# Patient Record
Sex: FEMALE | Race: WHITE | Employment: PART TIME | URBAN - METROPOLITAN AREA
[De-identification: names, ages, dates, MRNs, and addresses within clinical notes are randomized per-mention and may not be internally consistent; named-entity substitution may affect disease eponyms.]

---

## 2021-03-18 ENCOUNTER — VIRTUAL VISIT (OUTPATIENT)
Dept: INTERNAL MEDICINE CLINIC | Age: 21
End: 2021-03-18
Payer: COMMERCIAL

## 2021-03-18 DIAGNOSIS — R21 RASH: Primary | ICD-10-CM

## 2021-03-18 DIAGNOSIS — J35.1 TONSILLAR HYPERTROPHY, UNILATERAL: ICD-10-CM

## 2021-03-18 PROCEDURE — 99203 OFFICE O/P NEW LOW 30 MIN: CPT | Performed by: FAMILY MEDICINE

## 2021-03-18 RX ORDER — NORETHINDRONE ACETATE AND ETHINYL ESTRADIOL AND FERROUS FUMARATE 1MG-20(21)
1 KIT ORAL DAILY
COMMUNITY
Start: 2021-03-05

## 2021-03-18 RX ORDER — METHYLPREDNISOLONE 4 MG/1
TABLET ORAL
Qty: 1 DOSE PACK | Refills: 0 | Status: SHIPPED | OUTPATIENT
Start: 2021-03-18

## 2021-03-18 NOTE — PROGRESS NOTES
Chief Complaint   Patient presents with    New Patient     rash          1. Have you been to the ER, urgent care clinic since your last visit? Hospitalized since your last visit? No    2. Have you seen or consulted any other health care providers outside of the [address] [place] [address] since your last visit? Include any pap smears or colon screening.  No

## 2021-03-22 NOTE — PROGRESS NOTES
Sherri Trujillo (: 2000) is a 21 y.o. female, new patient, here for evaluation of the following chief complaint(s):   New Patient (rash )       ASSESSMENT/PLAN:  1. Rash  -     methylPREDNISolone (MEDROL DOSEPACK) 4 mg tablet; Take as directed for rash, Normal, Disp-1 Dose Pack, R-0  2. Tonsillar hypertrophy, unilateral    Pt recommended to get a rapid strep test, gargle, use pain relievers as needed, follow up if not improved    I have discussed the diagnosis with the patient and the intended plan as seen in the  orders above. The patient understands and agrees with the plan. All questions the patient posed were answered. Patient labs and/or xrays were reviewed  Past records were reviewed. Advised patient to call back or return to office if symptoms worsen/change/persist.  Discussed expected course/resolution/complications of diagnosis in detail with patient. Medication risks/benefits/costs/interactions/alternatives discussed with patient      Return if symptoms worsen or fail to improve. SUBJECTIVE/OBJECTIVE:  HPI  New patient for rash evaluation. Rash present on back of thigh x 1.5wk. Patient used a friend's ointment who also had a similar rash. Pt has 2 annular eruptions. The one on her buttock improved and left a dark katlyn. The one on her thigh appeared later and persists. Each itched some at onset. Pt has 1 enlarged tonsil with white exudate x Friday. Denies fever, joint aches, other uri sx. LMP 3-14-21. meds include ocp and topical acne product. PMH and allergic history are  Unremarkable. Review of Systems   Constitutional: Negative for chills and fever. HENT: Negative for congestion, ear pain, sinus pain and sneezing. Unilateral tonsillar enlargement with exudate   Respiratory: Negative for cough. Skin: Positive for rash.         Patient-Reported Vitals 3/18/2021   Patient-Reported Weight 101   Patient-Reported Height 5'6\"   Patient-Reported Temperature 97.7   Patient-Reported [facility] 03/18/2021       Physical Exam    [INSTRUCTIONS:  \"[x]\" Indicates a positive item  \"[]\" Indicates a negative item  -- DELETE ALL ITEMS NOT EXAMINED]    Constitutional: [x] Appears well-developed and well-nourished [x] No apparent distress      [] Abnormal -     Mental status: [x] Alert and awake  [x] Oriented to person/place/time [x] Able to follow commands    [] Abnormal -     Eyes:   EOM    [x]  Normal    [] Abnormal -   Sclera  [x]  Normal    [] Abnormal -          Discharge [x]  None visible   [] Abnormal -     HENT: [x] Normocephalic, atraumatic  [] Abnormal -   [x] Mouth/Throat: Mucous membranes are moist    External Ears [x] Normal  [] Abnormal -    Neck: [x] No visualized mass [] Abnormal -     Pulmonary/Chest: [x] Respiratory effort normal   [x] No visualized signs of difficulty breathing or respiratory distress        [] Abnormal -      Musculoskeletal:   [x] Normal gait with no signs of ataxia         [x] Normal range of motion of neck        [] Abnormal -     Neurological:        [x] No Facial Asymmetry (Cranial nerve 7 motor function) (limited exam due to video visit)          [x] No gaze palsy        [] Abnormal -          Skin:        [x] No significant exanthematous lesions or discoloration noted on facial skin         [] Abnormal -            Psychiatric:       [x] Normal Affect [] Abnormal -        [x] No Hallucinations    Other pertinent observable physical exam findings:-            Tae Herman, was evaluated through a synchronous (real-time) audio-video encounter. The patient (or guardian if applicable) is aware that this is a billable service. Verbal consent to proceed has been obtained within the past 12 months. The visit was conducted pursuant to the emergency declaration under the [facility], [address] [place] [address] authority and the [employer] and [employer] Act.   Patient identification was verified, and a caregiver was present when appropriate. The patient was located in a state where the provider was credentialed to provide care. An electronic signature was used to authenticate this note.   -- Eduin Castillo MD

## 2024-12-11 ENCOUNTER — RX ONLY (RX ONLY)
Age: 24
End: 2024-12-11

## 2024-12-11 RX ORDER — DOXYCYCLINE 100 MG/1
TABLET, FILM COATED ORAL
Qty: 28 | Refills: 0 | Status: ERX | COMMUNITY
Start: 2024-12-11

## 2024-12-11 RX ORDER — TOBRAMYCIN / DEXAMETHASONE 3; .5 MG/ML; MG/ML
SUSPENSION/ DROPS OPHTHALMIC
Qty: 5 | Refills: 1 | Status: ERX | COMMUNITY
Start: 2024-12-11

## 2024-12-12 ENCOUNTER — RX ONLY (RX ONLY)
Age: 24
End: 2024-12-12

## 2024-12-12 RX ORDER — TOBRAMYCIN / DEXAMETHASONE 3; .5 MG/ML; MG/ML
SUSPENSION/ DROPS OPHTHALMIC
Qty: 5 | Refills: 1 | Status: ERX

## 2024-12-13 ENCOUNTER — APPOINTMENT (OUTPATIENT)
Dept: URBAN - METROPOLITAN AREA CLINIC 309 | Age: 24
Setting detail: DERMATOLOGY
End: 2024-12-13

## 2024-12-13 DIAGNOSIS — L90.5 SCAR CONDITIONS AND FIBROSIS OF SKIN: ICD-10-CM

## 2024-12-13 PROCEDURE — OTHER ADDITIONAL NOTES: OTHER

## 2024-12-13 PROCEDURE — OTHER COSMETIC CONSULTATION: CUTERA SECRET PRO: OTHER

## 2024-12-13 PROCEDURE — OTHER COUNSELING: OTHER

## 2024-12-13 PROCEDURE — OTHER COSMETIC CONSULTATION: MICRONEEDLING: OTHER

## 2024-12-13 PROCEDURE — OTHER TREATMENT REGIMEN: OTHER

## 2024-12-13 ASSESSMENT — LOCATION SIMPLE DESCRIPTION DERM
LOCATION SIMPLE: INFERIOR FOREHEAD
LOCATION SIMPLE: LEFT CHEEK
LOCATION SIMPLE: RIGHT CHEEK

## 2024-12-13 ASSESSMENT — LOCATION ZONE DERM: LOCATION ZONE: FACE

## 2024-12-13 ASSESSMENT — LOCATION DETAILED DESCRIPTION DERM
LOCATION DETAILED: LEFT INFERIOR CENTRAL MALAR CHEEK
LOCATION DETAILED: INFERIOR MID FOREHEAD
LOCATION DETAILED: RIGHT INFERIOR CENTRAL MALAR CHEEK

## 2024-12-13 NOTE — HPI: PIMPLES (ACNE)
Is This A New Presentation, Or A Follow-Up?: Acne
Additional Comments (Use Complete Sentences): Patient presents today for cosmetic consultation for acne scarring. Has not tried any prior treatment options. Currently on spironolactone 100 mg PO QD and BPO in the evenings.

## 2024-12-13 NOTE — PROCEDURE: ADDITIONAL NOTES
Additional Notes: We reccomended patient start Accutane to help get rid of acne for good.\\n\\nWe reccomended RFMN to improve skin texture. Quoted pt $480 per session of RMFN ($800 - 40% employee discount). Informed pt she would need 4-5 sessions to see a maximum of 70% improvement in skin quality and texture. Also recommended Skin[employer]uticals Post-Ablative Kit ($280 -40%).\\n\\nInformed patient she may schedule RFMN at her own discretion.
Detail Level: Simple
Render Risk Assessment In Note?: no

## 2025-02-13 ENCOUNTER — RX ONLY (RX ONLY)
Age: 25
End: 2025-02-13

## 2025-02-13 RX ORDER — TAZAROTENE 0.45 MG/G
LOTION TOPICAL
Qty: 45 | Refills: 3 | Status: ERX | COMMUNITY
Start: 2025-02-13

## 2025-02-19 ENCOUNTER — RX ONLY (RX ONLY)
Age: 25
End: 2025-02-19

## 2025-02-19 RX ORDER — TAZAROTENE 1 MG/G
CREAM TOPICAL
Qty: 60 | Refills: 8 | Status: ERX | COMMUNITY
Start: 2025-02-19

## 2025-02-21 ENCOUNTER — RX ONLY (RX ONLY)
Age: 25
End: 2025-02-21

## 2025-02-21 RX ORDER — TRETIONIN 0.25 MG/G
CREAM TOPICAL
Qty: 45 | Refills: 4 | Status: ERX | COMMUNITY
Start: 2025-02-21

## 2025-02-25 ENCOUNTER — RX ONLY (RX ONLY)
Age: 25
End: 2025-02-25

## 2025-02-25 RX ORDER — TRETIONIN 0.25 MG/G
CREAM TOPICAL
Qty: 45 | Refills: 4 | Status: ERX

## 2025-03-14 ENCOUNTER — APPOINTMENT (OUTPATIENT)
Dept: URBAN - METROPOLITAN AREA CLINIC 309 | Age: 25
Setting detail: DERMATOLOGY
End: 2025-03-14

## 2025-03-14 DIAGNOSIS — Z41.9 ENCOUNTER FOR PROCEDURE FOR PURPOSES OTHER THAN REMEDYING HEALTH STATE, UNSPECIFIED: ICD-10-CM

## 2025-03-14 PROCEDURE — OTHER ADDITIONAL NOTES: OTHER

## 2025-03-14 PROCEDURE — OTHER CUTERA SECRET PRO: OTHER

## 2025-03-14 ASSESSMENT — LOCATION SIMPLE DESCRIPTION DERM
LOCATION SIMPLE: LEFT CHEEK
LOCATION SIMPLE: CHIN
LOCATION SIMPLE: LEFT FOREHEAD
LOCATION SIMPLE: RIGHT CHEEK

## 2025-03-14 ASSESSMENT — LOCATION DETAILED DESCRIPTION DERM
LOCATION DETAILED: LEFT MEDIAL FOREHEAD
LOCATION DETAILED: LEFT INFERIOR CENTRAL MALAR CHEEK
LOCATION DETAILED: RIGHT INFERIOR CENTRAL MALAR CHEEK
LOCATION DETAILED: LEFT CHIN

## 2025-03-14 ASSESSMENT — LOCATION ZONE DERM: LOCATION ZONE: FACE

## 2025-03-14 NOTE — HPI: COSMETIC PROCEDURE
When Outside In The Sun, Do You...: never burns, always tans
Additional History: Pt presents for RFMN #1

## 2025-03-14 NOTE — PROCEDURE: ADDITIONAL NOTES
Render Risk Assessment In Note?: no
Detail Level: Simple
Additional Notes: Pt defers doxycycline 100mg tablets as post-procedure prevention as she already has some doxy at home. Instructed pt to take doxycycline 100mg po bid after food and water 3 days.

## 2025-03-14 NOTE — PROCEDURE: CUTERA SECRET PRO
Intensity (%): 30
Add Another Setting?: no
Handpiece: CO2 Fractional Handpiece
Mode: 0.5s
External Cooling: Julian Cryo 6
Tip Type: 64 Pin Semi-Insulated Tip
Treatment Type: Secret Pro Ultra
Handpiece: RF Microneedling Handpiece
Rf (Ms): 300
External Cooling Fan Speed: 6
Shape: square
Consent: Prior to the procedure, written consent obtained. Risks reviewed including but not limited to discomfort, pain, redness, swelling, crusting, scabbing, blistering, scarring, darker or lighter pigmentary change, and infection, recurrence and incomplete removal.
Distance (Mm): 0.9
Eye Protection: opaque goggles
Pre-Procedure Care: Prior to the procedure the patient and all present had protective eyewear in place and a warning sign was placed on the door.
Comments: Procedure tolerated well and without any immediate post-procedural complications.
Procedure Note: Treatment was administered using the setting parameters listed above.
Repeat: double
Skin Type: IV
End-Point And Post-Procedure Care: The procedure continued until mild erythema was noted.  Immediately following the procedure, a Skin[employer]uticals Cell Cycle Catalyst, then CE Ferulic was dripped onto the treatment areas followed by skinceuticals triple lipid restore cream.  Post care reviewed with patient.
Number Of Passes: 2
Post-Care Instructions: I reviewed with the patient in detail post-care instructions and expectations.  Patient is to practice strict sun avoidance and sun protection. Procedure tolerated well and without any immediate post-procedural complications.
Scanning: right to left
Number Of Prepaid Treatments (Will Not Render If 0): 0
Topical Anesthesia Type: 23% lidocaine, 7% tetracaine
Shot Count: 440
Treatment Number: 1
Overlap (%): 50
I-Stack: 2nd
Length Of Topical Anesthesia Application (Optional): 45 minutes
Delay Time (Ms): 400
Detail Level: Simple
Energy (Mj): 25
Depth (Mm): 2.5
Rf (Ms): 350

## 2025-03-21 ENCOUNTER — APPOINTMENT (OUTPATIENT)
Dept: URBAN - METROPOLITAN AREA CLINIC 277 | Age: 25
Setting detail: DERMATOLOGY
End: 2025-03-21

## 2025-03-21 DIAGNOSIS — Z41.9 ENCOUNTER FOR PROCEDURE FOR PURPOSES OTHER THAN REMEDYING HEALTH STATE, UNSPECIFIED: ICD-10-CM

## 2025-03-21 PROCEDURE — OTHER RESTYLANE KYSSE INJECTION: OTHER

## 2025-03-21 PROCEDURE — OTHER MIPS QUALITY: OTHER

## 2025-03-21 ASSESSMENT — LOCATION SIMPLE DESCRIPTION DERM
LOCATION SIMPLE: LEFT LIP
LOCATION SIMPLE: RIGHT LIP

## 2025-03-21 ASSESSMENT — LOCATION DETAILED DESCRIPTION DERM
LOCATION DETAILED: LEFT SUPERIOR VERMILION LIP
LOCATION DETAILED: RIGHT INFERIOR VERMILION LIP

## 2025-03-21 ASSESSMENT — LOCATION ZONE DERM: LOCATION ZONE: LIP

## 2025-03-21 NOTE — PROCEDURE: MIPS QUALITY
Quality 226: Preventive Care And Screening: Tobacco Use: Screening And Cessation Intervention: Patient screened for tobacco use and is an ex/non-smoker
Quality 400a: One-Time Screening For Hepatitis C Virus (Hcv) And Treatment Initiation: Patient receives HCV antibody test with nonreactive result
Quality 431: Preventive Care And Screening: Unhealthy Alcohol Use - Screening: Patient not identified as an unhealthy alcohol user when screened for unhealthy alcohol use using a systematic screening method
Quality 130: Documentation Of Current Medications In The Medical Record: Current Medications Documented
Detail Level: Detailed

## 2025-03-21 NOTE — PROCEDURE: RESTYLANE KYSSE INJECTION
Temple Hollows Filler Volume In Cc: 0
Vermilion Lips Filler Volume In Cc: 0.5
Detail Level: Detailed
Anesthesia Type: 1% lidocaine with epinephrine
Filler: Restylane Kysse
Use Map Statement For Sites (Optional): No
Expiration Date (Month Year): 04/30/2026
Show Inventory Tab: Show
Post-Care Instructions: Patient instructed to apply ice to reduce swelling.
Map Statement: See Attached Map for Complete Details
Number Of Syringes (Required For Inventory): 1
Procedural Text: The filler was administered to the treatment areas noted above.
Additional Anesthesia Volume In Cc: 6
Consent: Written consent obtained. Risks include but not limited to bruising, beading, irregular texture, ulceration, infection, allergic reaction, scar formation, incomplete augmentation, temporary nature, procedural pain.
Lot #: [id number]
Price (Use Numbers Only, No Special Characters Or $): 399.09

## 2025-09-02 ENCOUNTER — RX ONLY (RX ONLY)
Age: 25
End: 2025-09-02

## 2025-09-02 RX ORDER — TAZAROTENE/NIACINAMIDE 0.05 %-4 %
CREAM (GRAM) TOPICAL
Qty: 30 | Refills: 3 | Status: ERX | COMMUNITY
Start: 2025-09-02